# Patient Record
Sex: MALE | Race: BLACK OR AFRICAN AMERICAN | NOT HISPANIC OR LATINO | Employment: UNEMPLOYED | URBAN - METROPOLITAN AREA
[De-identification: names, ages, dates, MRNs, and addresses within clinical notes are randomized per-mention and may not be internally consistent; named-entity substitution may affect disease eponyms.]

---

## 2022-10-15 ENCOUNTER — TELEPHONE (OUTPATIENT)
Dept: BEHAVIORAL HEALTH | Facility: CLINIC | Age: 32
End: 2022-10-15

## 2022-10-15 ENCOUNTER — HOSPITAL ENCOUNTER (EMERGENCY)
Facility: CLINIC | Age: 32
Discharge: HOME | End: 2022-10-19
Attending: EMERGENCY MEDICINE | Admitting: EMERGENCY MEDICINE
Payer: COMMERCIAL

## 2022-10-15 DIAGNOSIS — F22 PARANOID PSYCHOSIS (H): ICD-10-CM

## 2022-10-15 LAB
ALBUMIN SERPL BCG-MCNC: 4.6 G/DL (ref 3.5–5.2)
ALP SERPL-CCNC: 88 U/L (ref 40–129)
ALT SERPL W P-5'-P-CCNC: 6 U/L (ref 10–50)
AMPHETAMINES UR QL SCN: ABNORMAL
ANION GAP SERPL CALCULATED.3IONS-SCNC: 14 MMOL/L (ref 7–15)
AST SERPL W P-5'-P-CCNC: 18 U/L (ref 10–50)
BARBITURATES UR QL SCN: ABNORMAL
BASOPHILS # BLD AUTO: 0.1 10E3/UL (ref 0–0.2)
BASOPHILS NFR BLD AUTO: 1 %
BENZODIAZ UR QL SCN: ABNORMAL
BILIRUB SERPL-MCNC: 0.6 MG/DL
BUN SERPL-MCNC: 6.6 MG/DL (ref 6–20)
BZE UR QL SCN: ABNORMAL
CALCIUM SERPL-MCNC: 9.7 MG/DL (ref 8.6–10)
CANNABINOIDS UR QL SCN: ABNORMAL
CHLORIDE SERPL-SCNC: 103 MMOL/L (ref 98–107)
CREAT SERPL-MCNC: 0.6 MG/DL (ref 0.67–1.17)
DEPRECATED HCO3 PLAS-SCNC: 22 MMOL/L (ref 22–29)
EOSINOPHIL # BLD AUTO: 0.1 10E3/UL (ref 0–0.7)
EOSINOPHIL NFR BLD AUTO: 2 %
ERYTHROCYTE [DISTWIDTH] IN BLOOD BY AUTOMATED COUNT: 13 % (ref 10–15)
ETHANOL SERPL-MCNC: <0.01 G/DL
GFR SERPL CREATININE-BSD FRML MDRD: >90 ML/MIN/1.73M2
GLUCOSE SERPL-MCNC: 96 MG/DL (ref 70–99)
HCT VFR BLD AUTO: 43.1 % (ref 40–53)
HGB BLD-MCNC: 14.5 G/DL (ref 13.3–17.7)
IMM GRANULOCYTES # BLD: 0 10E3/UL
IMM GRANULOCYTES NFR BLD: 0 %
LYMPHOCYTES # BLD AUTO: 2.9 10E3/UL (ref 0.8–5.3)
LYMPHOCYTES NFR BLD AUTO: 43 %
MCH RBC QN AUTO: 29.2 PG (ref 26.5–33)
MCHC RBC AUTO-ENTMCNC: 33.6 G/DL (ref 31.5–36.5)
MCV RBC AUTO: 87 FL (ref 78–100)
MONOCYTES # BLD AUTO: 0.6 10E3/UL (ref 0–1.3)
MONOCYTES NFR BLD AUTO: 9 %
NEUTROPHILS # BLD AUTO: 3 10E3/UL (ref 1.6–8.3)
NEUTROPHILS NFR BLD AUTO: 45 %
NRBC # BLD AUTO: 0 10E3/UL
NRBC BLD AUTO-RTO: 0 /100
OPIATES UR QL SCN: ABNORMAL
PLATELET # BLD AUTO: 328 10E3/UL (ref 150–450)
POTASSIUM SERPL-SCNC: 3.8 MMOL/L (ref 3.4–5.3)
PROT SERPL-MCNC: 7.8 G/DL (ref 6.4–8.3)
RBC # BLD AUTO: 4.97 10E6/UL (ref 4.4–5.9)
SARS-COV-2 RNA RESP QL NAA+PROBE: NEGATIVE
SODIUM SERPL-SCNC: 139 MMOL/L (ref 136–145)
TSH SERPL DL<=0.005 MIU/L-ACNC: 2.36 UIU/ML (ref 0.3–4.2)
WBC # BLD AUTO: 6.7 10E3/UL (ref 4–11)

## 2022-10-15 PROCEDURE — 36415 COLL VENOUS BLD VENIPUNCTURE: CPT | Performed by: EMERGENCY MEDICINE

## 2022-10-15 PROCEDURE — C9803 HOPD COVID-19 SPEC COLLECT: HCPCS | Performed by: EMERGENCY MEDICINE

## 2022-10-15 PROCEDURE — 250N000013 HC RX MED GY IP 250 OP 250 PS 637: Performed by: EMERGENCY MEDICINE

## 2022-10-15 PROCEDURE — U0003 INFECTIOUS AGENT DETECTION BY NUCLEIC ACID (DNA OR RNA); SEVERE ACUTE RESPIRATORY SYNDROME CORONAVIRUS 2 (SARS-COV-2) (CORONAVIRUS DISEASE [COVID-19]), AMPLIFIED PROBE TECHNIQUE, MAKING USE OF HIGH THROUGHPUT TECHNOLOGIES AS DESCRIBED BY CMS-2020-01-R: HCPCS | Performed by: EMERGENCY MEDICINE

## 2022-10-15 PROCEDURE — 99285 EMERGENCY DEPT VISIT HI MDM: CPT | Performed by: EMERGENCY MEDICINE

## 2022-10-15 PROCEDURE — 84443 ASSAY THYROID STIM HORMONE: CPT | Performed by: EMERGENCY MEDICINE

## 2022-10-15 PROCEDURE — 80307 DRUG TEST PRSMV CHEM ANLYZR: CPT | Performed by: EMERGENCY MEDICINE

## 2022-10-15 PROCEDURE — 80359 METHYLENEDIOXYAMPHETAMINES: CPT | Performed by: EMERGENCY MEDICINE

## 2022-10-15 PROCEDURE — 90791 PSYCH DIAGNOSTIC EVALUATION: CPT

## 2022-10-15 PROCEDURE — 85025 COMPLETE CBC W/AUTO DIFF WBC: CPT | Performed by: EMERGENCY MEDICINE

## 2022-10-15 PROCEDURE — 99285 EMERGENCY DEPT VISIT HI MDM: CPT | Mod: 25 | Performed by: EMERGENCY MEDICINE

## 2022-10-15 PROCEDURE — 80053 COMPREHEN METABOLIC PANEL: CPT | Performed by: EMERGENCY MEDICINE

## 2022-10-15 PROCEDURE — 82077 ASSAY SPEC XCP UR&BREATH IA: CPT | Performed by: EMERGENCY MEDICINE

## 2022-10-15 RX ORDER — OLANZAPINE 5 MG/1
10 TABLET, ORALLY DISINTEGRATING ORAL 2 TIMES DAILY PRN
Status: DISCONTINUED | OUTPATIENT
Start: 2022-10-15 | End: 2022-10-19 | Stop reason: HOSPADM

## 2022-10-15 RX ORDER — QUETIAPINE FUMARATE 50 MG/1
50 TABLET, FILM COATED ORAL 2 TIMES DAILY
Status: DISCONTINUED | OUTPATIENT
Start: 2022-10-15 | End: 2022-10-19 | Stop reason: HOSPADM

## 2022-10-15 RX ADMIN — OLANZAPINE 10 MG: 5 TABLET, ORALLY DISINTEGRATING ORAL at 19:05

## 2022-10-15 ASSESSMENT — ACTIVITIES OF DAILY LIVING (ADL)
ADLS_ACUITY_SCORE: 33
ADLS_ACUITY_SCORE: 35

## 2022-10-15 NOTE — ED TRIAGE NOTES
Pt arrives ambulatory to triage seeking a mental health assessment. Pt endorses visual/auditory hallucinations. Pt endorses suicidal thoughts, but difficult to fully assess.

## 2022-10-15 NOTE — ED PROVIDER NOTES
[place] EMERGENCY DEPARTMENT ([facility])  10/15/22 ED 9    History     Chief Complaint   Patient presents with     Hallucinations     The history is provided by the patient and medical records.     Rhonda Bueno is a 32 year old [place] male who presents to the Emergency Department  department with ongoing hallucinations.  It is unclear what his past history is as the patient has no documentation in our Epic system and nothing can be found in Care Everywhere.  It is also unknown exactly what medications the patient is on currently.  The patient presents stating that he is being used as a machine by others.  Patient states he is only one person fighting against all these other individuals and he points to one of them standing in the room which is clearly a hallucination.  The patient states that his front lower teeth hurt but other than that he has no complaints of other pain, chest pain, shortness of breath, abdominal pain, vomiting or diarrhea.  The patient admits to paranoid hallucinations, delusions, and suicidal ideation.    This part of the document was transcribed by Dee Dee Lowry, Medical Scribe.      I have reviewed the Medications, Allergies, Past Medical and Surgical History, and Social History in the Epic system.    No past medical history on file.   Search of care everywhere was unsuccessful    No past surgical history on file.        Review of your medicines      You have not been prescribed any medications.         Past medical history, past surgical history, medications, and allergies were reviewed with the patient. Additional pertinent items: Unknown    No family history on file.    Social History     Tobacco Use     Smoking status: Not on file     Smokeless tobacco: Not on file   Substance Use Topics     Alcohol use: Not on file     Social history was reviewed with the patient. Additional pertinent items: Unknown    No Known Allergies    Review of Systems  A complete review of  systems was attempted but limited due to Psychiatric condition.    Physical Exam   BP: (!) 133/93  Pulse: 99  Temp: 98.6  F (37  C)  Resp: 17  SpO2: 100 %      Physical Exam  Vitals and nursing note reviewed.   Constitutional:       Appearance: He is not diaphoretic.      Comments: Tangential paranoid speech   HENT:      Head: Atraumatic.   Eyes:      Extraocular Movements: Extraocular movements intact.      Pupils: Pupils are equal, round, and reactive to light.   Cardiovascular:      Rate and Rhythm: Regular rhythm.      Heart sounds: Normal heart sounds.   Pulmonary:      Breath sounds: Normal breath sounds.   Abdominal:      Palpations: Abdomen is soft.      Tenderness: There is no abdominal tenderness.   Musculoskeletal:         General: No deformity.      Cervical back: Neck supple.   Neurological:      General: No focal deficit present.      Mental Status: He is alert.   Psychiatric:      Comments: Paranoid, delusional, hallucinatory         ED Course        Procedures         Results for orders placed or performed during the hospital encounter of 10/15/22 (from the past 24 hour(s))   Urine Drugs of Abuse Screen    Narrative    The following orders were created for panel order Urine Drugs of Abuse Screen.  Procedure                               Abnormality         Status                     ---------                               -----------         ------                     Drug abuse screen 1 urin...[[id number]]                                                   Please view results for these tests on the individual orders.   CBC with platelets differential    Narrative    The following orders were created for panel order CBC with platelets differential.  Procedure                               Abnormality         Status                     ---------                               -----------         ------                     CBC with platelets and d...[[id number]]                      Final result                  Please view results for these tests on the individual orders.   Comprehensive metabolic panel   Result Value Ref Range    Sodium 139 136 - 145 mmol/L    Potassium 3.8 3.4 - 5.3 mmol/L    Chloride 103 98 - 107 mmol/L    Carbon Dioxide (CO2) 22 22 - 29 mmol/L    Anion Gap 14 7 - 15 mmol/L    Urea Nitrogen 6.6 6.0 - 20.0 mg/dL    Creatinine 0.60 (L) 0.67 - 1.17 mg/dL    Calcium 9.7 8.6 - 10.0 mg/dL    Glucose 96 70 - 99 mg/dL    Alkaline Phosphatase 88 40 - 129 U/L    AST 18 10 - 50 U/L    ALT 6 (L) 10 - 50 U/L    Protein Total 7.8 6.4 - 8.3 g/dL    Albumin 4.6 3.5 - 5.2 g/dL    Bilirubin Total 0.6 <=1.2 mg/dL    GFR Estimate >90 >60 mL/min/1.73m2   TSH   Result Value Ref Range    TSH 2.36 0.30 - 4.20 uIU/mL   Alcohol ethyl   Result Value Ref Range    Alcohol ethyl <0.01 (H) <=0.00 g/dL   CBC with platelets and differential   Result Value Ref Range    WBC Count 6.7 4.0 - 11.0 10e3/uL    RBC Count 4.97 4.40 - 5.90 10e6/uL    Hemoglobin 14.5 13.3 - 17.7 g/dL    Hematocrit 43.1 40.0 - 53.0 %    MCV 87 78 - 100 fL    MCH 29.2 26.5 - 33.0 pg    MCHC 33.6 31.5 - 36.5 g/dL    RDW 13.0 10.0 - 15.0 %    Platelet Count 328 150 - 450 10e3/uL    % Neutrophils 45 %    % Lymphocytes 43 %    % Monocytes 9 %    % Eosinophils 2 %    % Basophils 1 %    % Immature Granulocytes 0 %    NRBCs per 100 WBC 0 <1 /100    Absolute Neutrophils 3.0 1.6 - 8.3 10e3/uL    Absolute Lymphocytes 2.9 0.8 - 5.3 10e3/uL    Absolute Monocytes 0.6 0.0 - 1.3 10e3/uL    Absolute Eosinophils 0.1 0.0 - 0.7 10e3/uL    Absolute Basophils 0.1 0.0 - 0.2 10e3/uL    Absolute Immature Granulocytes 0.0 <=0.4 10e3/uL    Absolute NRBCs 0.0 10e3/uL     Orders Placed This Encounter   Procedures     Comprehensive metabolic panel     TSH     Alcohol ethyl     Drug Screen 9, Ser/Jojo w/ Rflx to Conf     Drug abuse screen 1 urine (ED)     CBC with platelets and differential     Asymptomatic COVID-19 Virus (Coronavirus) by PCR     Suicide Cart     Urine Drugs of Abuse Screen      CBC with platelets differential     Behavioral medicine saw the patient and recommended hospitalization.    Assessments & Plan (with Medical Decision Making)     I have reviewed the nursing notes and discussed the case with behavioral medicine.    Medications   QUEtiapine (SEROquel) tablet 50 mg (has no administration in time range)   OLANZapine zydis (zyPREXA) ODT tab 10 mg (10 mg Oral Given 10/15/22 1905)         Final diagnoses:   Paranoid psychosis (H)     Admit .  Holdable      DARYA EUGENE MD    10/15/2022   [facility] EMERGENCY DEPARTMENT     Darya Eugene MD  10/15/22 1927

## 2022-10-15 NOTE — ED NOTES
ED Triage Provider Note  [facility]  Encounter Date: Oct 15, 2022    History:  Chief Complaint   Patient presents with     Hallucinations     Rhonda Bueno is a 32 year old male who presents to the ED with mental health concerns.  Patient describes paranoia and hallucinations.  He endorses suicidal ideation but is difficult to fully assess this as he is very tangential and disorganized.  He denies any drug or alcohol use.    Review of Systems:  Hallucinations    Exam:  BP (!) 133/93   Pulse 99   Temp 98.6  F (37  C) (Oral)   Resp 17   SpO2 100%   General: No acute distress. Appears stated age.   Cardio: Regular rate, extremities well perfused  Resp: Normal work of breathing, grossly normal respiratory rate  Neuro: Alert. CN II-XII grossly intact. Grossly intact strength.   Psych: Tangential, paranoid with hallucinations.    Medical Decision Making:  Patient arriving to the ED with problem as above. A medical screening exam was performed. DEC orders initiated from Triage. The patient is appropriate to be immediately roomed.       Saad Torre,  on 10/15/2022 at 4:41 PM     Saad Torre,   10/15/22 5931

## 2022-10-15 NOTE — TELEPHONE ENCOUNTER
S DEC called to give clinical on 32/M in [facility]    B Pt presents paranoid, disorganized, delusional, tangential speech, difficulty answering questions for assessment. Pt made SI comment during triage but had difficulty answering questions. Appears that pt responding to internal stimuli, AH and VH. Unknown if pt has had prev [facility] stays,  OP providers or any  medications RX'd. MH DX unspecified psychosis. No HI or aggression. Pt denies substance use. No major medical concerns. Ambulatory, eating, drinking.     A Vol    R In [facility] awaiting placement.   Patient cleared and ready for behavioral bed placement: Yes

## 2022-10-15 NOTE — CONSULTS
"Diagnostic Evaluation Consultation  Crisis Assessment    Patient was assessed: Forrest  Patient location: [facility] [place]  Was a release of information signed: No. Reason: patient was seen virtually      Referral Data and Chief Complaint  Patient is a  year old, who uses he/him pronouns, and presents to the ED alone. Patient is referred to the ED by self. Patient is presenting to the ED for the following concerns: Patient presented to the ED disorganized, responding to internal stimuli and delusional.  Patient was tangential.   .      Informed Consent and Assessment Methods     Patient is his own guardian. Writer met with patient and explained the crisis assessment process, including applicable information disclosures and limits to confidentiality, assessed understanding of the process, and obtained consent to proceed with the assessment. Patient was observed to be able to participate in the assessment as evidenced by patient could not answer interview questions.. Assessment methods included conducting a formal interview with patient, review of medical records, collaboration with medical staff, and obtaining relevant collateral information from family and community providers when available..     Over the course of this crisis assessment offered validation. Patient's response to interventions was unrelated to interview question.  Patient was responding to internal stimuli.  When asked  \"where do you live\" Patient answered \"here with others.myalgias dad  and I can see his face now...\"  When asked if he is on any medication, he lifted a cup and stated \"now you see I can take what I want...\"     Summary of Patient Situation     Patient was very disorganized and did not have the ability to respond to interview questions.  He made statement pertaining to suicide but he couldn't answer subsequent questions. Patient was tangential.        Brief Psychosocial History    Patient was not able to participate.  Writer " looked at other resources for information but could not see any information relating to patient.    Significant Clinical History      {Patient presented with paranoia.  Patient was responding to internal stimuli. No history of treatment was found.       Collateral Information  None reported by patient     Risk Assessment  ESS-6  1.a. Over the past 2 weeks, have you had thoughts of killing yourself? No  1.b. Have you ever attempted to kill yourself and, if yes, when did this last happen? No   2. Recent or current suicide plan? No   3. Recent or current intent to act on ideation? No  4. Lifetime psychiatric hospitalization? No  5. Pattern of excessive substance use? No  6. Current irritability, agitation, or aggression? No  Scoring note: BOTH 1a and 1b must be yes for it to score 1 point, if both are not yes it is zero. All others are 1 point per number. If all questions 1a/1b - 6 are no, risk is negligible. If one of 1a/1b is yes, then risk is mild. If either question 2 or 3, but not both, is yes, then risk is automatically moderate regardless of total score. If both 2 and 3 are yes, risk is automatically high regardless of total score.      Score: 0, negligible risk      Does the patient have access to lethal means? No     Does the patient engage in non-suicidal self-injurious behavior (NSSI/SIB)? no     Does the patient have thoughts of harming others? No     Is the patient engaging in sexually inappropriate behavior?  no        Current Substance Abuse     Is there recent substance abuse? no     Was a urine drug screen or blood alcohol level obtained: Yes result is pending       Mental Status Exam     Affect: Dramatic   Appearance: Disheveled    Attention Span/Concentration: Inattentive  Eye Contact: Variable   Fund of Knowledge: Delayed    Language /Speech Content: Non-fluent   Language /Speech Volume: Normal    Language /Speech Rate/Productions: Pressured    Recent Memory: Poor   Remote Memory: Poor   Mood:  Anxious    Orientation to Person: No    Orientation to Place: No   Orientation to Time of Day: No    Orientation to Date: No    Situation (Do they understand why they are here?): Answer: to get help    Psychomotor Behavior: Normal    Thought Content: Paranoia   Thought Form: Tangential      History of commitment: No           Medication    Psychotropic medications: No  Medication changes made in the last two weeks: No       Current Care Team    Primary Care Provider: No  Psychiatrist: No  Therapist: No  : No     CTSS or ARMHS: No  ACT Team: No  Other: No      Diagnosis    298.9 (F29)  Unspecified Schizophrenia Spectrum   311 (F32.9) Unspecified Depressive Disorder  - primary   Adjustment Disorders  309.28 (F43.23) With mixed anxiety and depressed mood - by history     Clinical Summary and Substantiation of Recommendations      Patient presented to the ED with delusion and disorganized thoughts.  Patient mentioned that he was born in [place] and that he witnessed his dad  and that he is alone and has no support.  Patient presented to the ED alone and told the triage staff that he needed help.  Patient was responding to internal stimuli and couldn't respond to interview questions.  Writer consulted with ED physician and the reommendation is admit inpatient for further mental health evaluation.      Admission to Inpatient Level of Care is indicated due to:    1. Patient risk of severity of behavioral health disorder is appropriate to proposed level of care as indicated by:    Imminent Risk of Harm: Command auditory hallucinations or paranoid delusions contributing to risk of suicide or self harm  And/or:  Behavioral health disorder is present and appropriate for inpatient care with both of the following:     Severe psychiatric, behavioral or other comorbid conditions are appropriate for management at inpatient mental health as indicated by at least one of the following:   o Hallucinations; delusions;  positive symptoms    Severe dysfunction in daily living is present as indicated by at least one of the following:   o Complete inability to maintain any appropriate aspect of personal responsibility in any adult roles    2. Inpatient mental health services are necessary to meet patient needs and at least one of the following:  Specific condition related to admission diagnosis is present and judged likely to further improve at proposed level of care    3. Situation and expectations are appropriate for inpatient care, as indicated by one of the following:   Voluntary treatment at lower level of care is not feasible    Disposition    Recommended disposition: Inpatient Mental Health       Reviewed case and recommendations with attending provider. Attending Name: Dr. Huber       Attending concurs with disposition: Yes       Patient concurs with disposition: Yes       Guardian concurs with disposition: NA      Final disposition: Inpatient mental health .     Inpatient Details (if applicable):   Is patient admitted voluntarily:Yes      Patient aware of potential for transfer if there is not appropriate placement? Yes       Patient is willing to travel outside of the [facility] for placement? Yes      Behavioral Intake Notified? Yes: Date: 10/15/22 Time: 6:50 pm.     Outpatient Details (if applicable):   Aftercare plan and appointments placed in the AVS and provided to patient: No. Rationale: patient is admitting     Was lethal means counseling provided as a part of aftercare planning? No;       Assessment Details    Patient interview started at: 6:15 pm and completed at: 6:45 pm.     Total duration spent on the patient case in minutes: .50 hrs      CPT code(s) utilized: 04151 - Psychotherapy for Crisis - 60 (30-74*) min       RAQUEL Olson, MSW, LICSW, LMHP  DEC - Triage & Transition Services  Callback: [phone]

## 2022-10-16 ENCOUNTER — TELEPHONE (OUTPATIENT)
Dept: BEHAVIORAL HEALTH | Facility: CLINIC | Age: 32
End: 2022-10-16

## 2022-10-16 PROCEDURE — 250N000013 HC RX MED GY IP 250 OP 250 PS 637: Performed by: EMERGENCY MEDICINE

## 2022-10-16 RX ORDER — OLANZAPINE 5 MG/1
5 TABLET, ORALLY DISINTEGRATING ORAL ONCE
Status: COMPLETED | OUTPATIENT
Start: 2022-10-16 | End: 2022-10-16

## 2022-10-16 RX ADMIN — OLANZAPINE 5 MG: 5 TABLET, ORALLY DISINTEGRATING ORAL at 16:35

## 2022-10-16 RX ADMIN — QUETIAPINE FUMARATE 50 MG: 50 TABLET ORAL at 11:55

## 2022-10-16 RX ADMIN — QUETIAPINE FUMARATE 50 MG: 50 TABLET ORAL at 20:03

## 2022-10-16 ASSESSMENT — ACTIVITIES OF DAILY LIVING (ADL)
ADLS_ACUITY_SCORE: 35

## 2022-10-16 NOTE — PROGRESS NOTES
Writer contacted [facility] intake and placed patient on bed queue.  Central In take can be reached at [phone] for bed update.

## 2022-10-16 NOTE — CONSULTS
Samquyen Meena Bueno  October 15, 2022  Plan of Care Hand-off Note     Patient Care Path: Inpatient Mental Health    Plan for Care:       Patient presented to the ED with delusion and disorganized thoughts.  Patient mentioned that he was born in [place] and that he witnessed his dad  and that he is alone and has no support.  Patient presented to the ED alone and told the triage staff that he needed help.  Patient was responding to internal stimuli and couldn't respond to interview questions.  Writer consulted with ED physician and the reommendation is admit inpatient for further mental health evaluation. Patient has no community providers and he is not on any known medications.          Critical Safety Issues: Patient is paranoid, responding to both auditory and visual hallucinations     Overview:  This patient is a child/adolescent: No    This patient has additional special visitor precautions: No    Legal Status: Voluntary    Contacts:   none reported, none identified: Contact .    Psychiatry Consult:  Adult Psychiatry Consult requested related to mental health evaluation. Psychiatry IP Consult Order Placed: No .    Updated RN regarding plan of care.    RAQUEL Olson

## 2022-10-16 NOTE — ED PROVIDER NOTES
32-year-old male signed over to me by my colleague at 3 PM pending reassessment by behavioral 's.  I did receive a call shortly after signout with concern the patient continues to have significant disorganization.  As he presented with SI concern at this time is patient not safe for dismissal to home.  Unclear if primarily substance-induced psychosis versus more organic.  Plan will be for p.o. dose of Zyprexa with continued monitoring in the emergency department.  At this time the patient is voluntary but likely holdable.  Recommendation per DEC team is for continued emergency department observation to assess for clearing.  Signed over to my colleague at 2300 pending above plan.     Nixon Stevenson MD  10/16/22 7840

## 2022-10-16 NOTE — ED NOTES
[facility] ED Mental Health Handoff Note:       Brief HPI:  This is a 32 year old male signed out to me by Dr. Lane.  See initial ED Provider note for full details of the presentation. Interval history is pertinent for nothing.    Home meds reviewed and ordered/administered: Yes. No home meds listed.    Medically stable for inpatient mental health admission: Yes.    Evaluated by mental health: Yes. The recommendation is for inpatient mental health treatment. Bed search in process    Safety concerns: At the time I received sign out, there were no safety concerns.    Hold Status:  Active Orders   N/A           Exam:   Patient Vitals for the past 24 hrs:   BP Temp Temp src Pulse Resp SpO2   10/16/22 0026 115/68 98.4  F (36.9  C) Oral 65 18 99 %   10/15/22 1639 (!) 133/93 98.6  F (37  C) Oral 99 17 100 %           ED Course:    Medications   QUEtiapine (SEROquel) tablet 50 mg (50 mg Oral Not Given 10/15/22 2016)   OLANZapine zydis (zyPREXA) ODT tab 10 mg (10 mg Oral Given 10/15/22 1905)            There were no significant events during my shift.    Patient was signed out to the oncoming provider      Impression:    ICD-10-CM    1. Paranoid psychosis (H)  F22           Plan:    1. Awaiting inpatient mental health admission/transfer.   2. Notified by nurse that pt starting to be more interactive, requesting detox from alcohol and drugs, will request re-eval by DEC      RESULTS:   Results for orders placed or performed during the hospital encounter of 10/15/22 (from the past 24 hour(s))   CBC with platelets differential     Status: None    Collection Time: 10/15/22  6:21 PM    Narrative    The following orders were created for panel order CBC with platelets differential.  Procedure                               Abnormality         Status                     ---------                               -----------         ------                     CBC with platelets and d...[[id number]]                      Final  result                 Please view results for these tests on the individual orders.   Comprehensive metabolic panel     Status: Abnormal    Collection Time: 10/15/22  6:21 PM   Result Value Ref Range    Sodium 139 136 - 145 mmol/L    Potassium 3.8 3.4 - 5.3 mmol/L    Chloride 103 98 - 107 mmol/L    Carbon Dioxide (CO2) 22 22 - 29 mmol/L    Anion Gap 14 7 - 15 mmol/L    Urea Nitrogen 6.6 6.0 - 20.0 mg/dL    Creatinine 0.60 (L) 0.67 - 1.17 mg/dL    Calcium 9.7 8.6 - 10.0 mg/dL    Glucose 96 70 - 99 mg/dL    Alkaline Phosphatase 88 40 - 129 U/L    AST 18 10 - 50 U/L    ALT 6 (L) 10 - 50 U/L    Protein Total 7.8 6.4 - 8.3 g/dL    Albumin 4.6 3.5 - 5.2 g/dL    Bilirubin Total 0.6 <=1.2 mg/dL    GFR Estimate >90 >60 mL/min/1.73m2   TSH     Status: Normal    Collection Time: 10/15/22  6:21 PM   Result Value Ref Range    TSH 2.36 0.30 - 4.20 uIU/mL   Alcohol ethyl     Status: Abnormal    Collection Time: 10/15/22  6:21 PM   Result Value Ref Range    Alcohol ethyl <0.01 (H) <=0.00 g/dL   CBC with platelets and differential     Status: None    Collection Time: 10/15/22  6:21 PM   Result Value Ref Range    WBC Count 6.7 4.0 - 11.0 10e3/uL    RBC Count 4.97 4.40 - 5.90 10e6/uL    Hemoglobin 14.5 13.3 - 17.7 g/dL    Hematocrit 43.1 40.0 - 53.0 %    MCV 87 78 - 100 fL    MCH 29.2 26.5 - 33.0 pg    MCHC 33.6 31.5 - 36.5 g/dL    RDW 13.0 10.0 - 15.0 %    Platelet Count 328 150 - 450 10e3/uL    % Neutrophils 45 %    % Lymphocytes 43 %    % Monocytes 9 %    % Eosinophils 2 %    % Basophils 1 %    % Immature Granulocytes 0 %    NRBCs per 100 WBC 0 <1 /100    Absolute Neutrophils 3.0 1.6 - 8.3 10e3/uL    Absolute Lymphocytes 2.9 0.8 - 5.3 10e3/uL    Absolute Monocytes 0.6 0.0 - 1.3 10e3/uL    Absolute Eosinophils 0.1 0.0 - 0.7 10e3/uL    Absolute Basophils 0.1 0.0 - 0.2 10e3/uL    Absolute Immature Granulocytes 0.0 <=0.4 10e3/uL    Absolute NRBCs 0.0 10e3/uL   Asymptomatic COVID-19 Virus (Coronavirus) by PCR Nasopharyngeal     Status:  Normal    Collection Time: 10/15/22  7:05 PM    Specimen: Nasopharyngeal; Swab   Result Value Ref Range    SARS CoV2 PCR Negative Negative    Narrative    Testing was performed using the Xpert Xpress SARS-CoV-2 Assay on the  Cepheid Gene-Xpert Instrument Systems. Additional information about  this Emergency Use Authorization (EUA) assay can be found via the Lab  Guide. This test should be ordered for the detection of SARS-CoV-2 in  individuals who meet SARS-CoV-2 clinical and/or epidemiological  criteria. Test performance is unknown in asymptomatic patients. This  test is for in vitro diagnostic use under the FDA EUA for  laboratories certified under CLIA to perform high complexity testing.  This test has not been FDA cleared or approved. A negative result  does not rule out the presence of PCR inhibitors in the specimen or  target RNA in concentration below the limit of detection for the  assay. The possibility of a false negative should be considered if  the patient's recent exposure or clinical presentation suggests  COVID-19. This test was validated by the [facility]. This laboratory is certified under  the Clinical Laboratory Improvement Amendments of 1988 (CLIA-88) as  qualified to perform high complexity laboratory testing.     Urine Drugs of Abuse Screen     Status: Abnormal    Collection Time: 10/15/22  8:18 PM    Narrative    The following orders were created for panel order Urine Drugs of Abuse Screen.  Procedure                               Abnormality         Status                     ---------                               -----------         ------                     Drug abuse screen 1 urin...[[id number]]  Abnormal            Final result                 Please view results for these tests on the individual orders.   Drug abuse screen 1 urine (ED)     Status: Abnormal    Collection Time: 10/15/22  8:18 PM   Result Value Ref Range    Amphetamines  Urine Screen Positive (A) Screen Negative    Barbituates Urine Screen Negative Screen Negative    Benzodiazepine Urine Screen Negative Screen Negative    Cannabinoids Urine Screen Negative Screen Negative    Cocaine Urine Screen Negative Screen Negative    Opiates Urine Screen Negative Screen Negative             CHRIS BECKER MD PhD                       Chris Becker MD  10/16/22 1126       Chris Becker MD  10/16/22 1348
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
## 2022-10-16 NOTE — TELEPHONE ENCOUNTER
0539 Bed Search Update:    [facility] beds available  [facility] beds available  [facility] beds [facility]  [facility]  [facility] beds [facility]  [facility] beds [facility]  [facility]-No beds available    Remains on wait list.

## 2022-10-16 NOTE — TELEPHONE ENCOUNTER
Inpatient Bed Call Log 10/16/2022 7:04 AM      Adults:   [facility] only     [facility] is posting 0 beds.       [facility] is posting 0 beds.     [facility] is posting 0 beds.     [facility] is posting 0 beds.     [facility] is [facility] 0 beds.     [facility] is posting 0 beds.     [facility] is posting 0 beds.     [facility] is posting 0 beds.        [facility] is posting 0 beds. Mixed unit 12+. Low acuity only.      [facility] is positing 0 beds. No aggression.      [facility] is posting 0 beds.      [facility] is posting 0 beds. Low acuity only.     [facility] is posting 0 beds.     [facility] is posting 0 beds. Low acuity.      [facility] is posting 0 beds. 72 hr hold required.      [facility] is posting 1 beds.         [facility] is posting 0 beds. Vol only, No Hx of aggression, violence or assault. No sexual offenders. No 72 hr holds.     [facility] is posting 8 beds. (Must have the cognitive ability to do programming. No aggressive or violent behavior or recent HX in the last 2 yrs. MH must be primary.)     [facility] is posting 0 beds. Low acuity only. Violence and aggression capped.      [facility] access hospital is posting 0 beds. Low acuity, Neg Covid.      [facility] is posting 0 beds. Covid neg. Vol only. Combined adolescent and adult unit. No aggressive or violent behavior. No registered sex offenders.      [facility] is posting 2 beds. Call for details.     [facility] is posting 0 beds.     7:40am No appropriate beds available at this time.

## 2022-10-17 ENCOUNTER — TELEPHONE (OUTPATIENT)
Dept: BEHAVIORAL HEALTH | Facility: CLINIC | Age: 32
End: 2022-10-17

## 2022-10-17 PROCEDURE — 250N000013 HC RX MED GY IP 250 OP 250 PS 637: Performed by: EMERGENCY MEDICINE

## 2022-10-17 RX ADMIN — QUETIAPINE FUMARATE 50 MG: 50 TABLET ORAL at 20:03

## 2022-10-17 RX ADMIN — QUETIAPINE FUMARATE 50 MG: 50 TABLET ORAL at 08:22

## 2022-10-17 ASSESSMENT — ACTIVITIES OF DAILY LIVING (ADL)
ADLS_ACUITY_SCORE: 35

## 2022-10-17 NOTE — PLAN OF CARE
Shift: 2754-5064    Pt was cooperative and calm, but has disorganized thoughts. Pt slept throughout the night and denies any pain.     VS: BP 96/44 (BP Location: Right arm)   Pulse 67   Temp (!) 96.3  F (35.7  C) (Oral)   Resp 16   SpO2 99%

## 2022-10-17 NOTE — ED NOTES
"Triage & Transition Services, Extended Care     Therapy Progress Note    Patient: Rhonda goes by \"Rhonda,\" uses he/him pronouns  Date of Service: October 17, 2022  Site of Service: [facility] ED  Patient was seen virtually (AmWell cart or other teleconferencing device).     Presenting problem:   Rhonda is followed related to Long wait time for admission: 47+ hours at the time of this note. Please see initial DEC/[facility] Crisis Assessment completed by Nhung Chan on 10/16/22 for complete assessment information. Notable concerns include hallucinations,     Individuals Present: Samquyen & Travis Giang    Session start: 2:37 PM  Session end: 2:57 PM  Session duration in minutes: 20  Session number: 2  Anticipated number of sessions or this episode of care: 1-4  CPT utilized: 68057 - Psychotherapy (with patient) - 30 (16-37*) min    Current Presentation:   Patient was somewhat engaged while meeting with writer. Patient did request an interpretur although he was responding to writer in english at times. Additionally even with the  patient was having difficulty tracking the conversation. Patient continues to appear somewhat grandiose, and exhibits signs of paranoia. Patient states he sees \"all things from God's view\" and endorses feeling persecuted by everyone as if \"I am the most terrible being on the planet\". Patient continues to have difficulty with discussing events leading him to the hospital, and does appear somewhat confused at times. When asked about suicidal thoughts patient does not confirm or deny them; however he does report that he feels \"I will be dead soon\".     Mental Status Exam:   Appearance: awake, alert and dressed in hospital scrubs  Attitude: somewhat cooperative  Eye Contact: looking around room  Mood: anxious and depressed  Affect: intensity is heightened  Speech: somewhat slurred  Psychomotor Behavior: no evidence of tardive dyskinesia, dystonia, or tics  Thought Process:  " disorganized and evidence of thought blocking present  Associations: no loose associations  Thought Content: passive suicidal ideation present and patient appears to be responding to internal stimuli  Insight: limited  Judgement: poor  Oriented to: person and place  Attention Span and Concentration: limited  Recent and Remote Memory: poor    Diagnosis:   298.9 (F29)  Unspecified Schizophrenia Spectrum   311 (F32.9) Unspecified Depressive Disorder  - primary   Adjustment Disorders  309.28 (F43.23) With mixed anxiety and depressed mood - by history     Therapeutic Intervention(s):   Provided active listening, unconditional positive regard, and validation. Engaged in guided discovery, explored patient's perspectives and helped expand them through socratic dialogue.    Treatment Objective(s) Addressed:   The focus of this session was on rapport building, orienting the patient to therapy and assessing safety.     Progress Towards Goals:   Patients symptoms impact his ability to engage in goal setting.    Case Management:   Writer did speak to patients , Hilario [phone], through [facility]. Hilario reports that patient has had several admissions to [facility], including a few this year. He has had 3 suicide attempts this year, including jumping off a 3rd floor window, and overdosing two times. Additionally Hilario reports that patient generally needs questions to be repeated to him several times.Hilario also reports patient has a long history of substance use, and he has been known to use alcohol, crack, meth, heroine, and fentynl. Hilario has been working with patient since he move to [place] from [place].    General Recommendations:   Continue to monitor for harm. Consider: Use a positive, direct and calm approach. Pt's tend to match the energy/mood of the staff. Keep focus positive and upbeat, Listen in a neutral, non-judgmental way. Offer reassurance and Be mindful of your nonverbal cues (body language,  facial expressions)    Plan:   Writer continues to recommend inpatient mental health placement at this time as patient continues to be disorganized, confused, and likely experiencing symptoms of psychosis. Patient also made references to being dead soon, so writer feels he would continue to benefit from an inpatient mental health hospitalization.  Writer does feel patient is holdable if he were to try and leave.    Plan for Care reviewed with Assigned Medical Provider? Yes. Provider, ED MD, response: in agreement with IP MH placement     Travis Giang   Licensed Mental Health Professional (LMHP), [facility]  [phone]

## 2022-10-17 NOTE — TELEPHONE ENCOUNTER
R: Pt in [place] [facility] awaiting placement. Per chart, metro area for placement    7pm bed search    [facility]: No beds available  [facility]  [facility]: No beds available  [facility]: No beds available  [facility]: No [facility]  [facility]: No beds available  [facility]: No beds available  [facility]: No beds available  [facility]: No beds available    Pt placed on work list until appropriate placement is available

## 2022-10-17 NOTE — PROGRESS NOTES
Mohamed more coherent and participating slightly more throughout day.  Did eat and drink with much encouragement, wanting to sleep with blankets over head all day.  Notified MD that patient requested ETOH and Drug rehab.  Resting in bed.  No hallucinations noted.  Speaks of God knowing all, but otherwise alert and oriented.

## 2022-10-17 NOTE — TELEPHONE ENCOUNTER
[facility] Access Inpatient Bed Call Log 10/17/2022 3:25 PM        Adults:    [place] is at capacity.    [facility] is posting 0 beds[facility]    [facility] is posting 0 beds.    [facility] is posting 0 beds.    [facility] is posting 0 beds.    [facility] is posting 0 beds.     [facility] is posting 0 beds.    [facility] is posting 0 beds.    [facility] is posting 0 beds.        [facility] is posting 0 beds. Mixed unit 12+. Low acuity only.    [facility] [place] is positing 0 beds. No aggression.    [facility] is posting 0 beds.    [facility] is posting 0 beds. Low acuity only.    [facility] is posting 0 beds.    [facility] is posting 0 beds. Low acuity.    [facility] is posting 0 beds. 72 hr hold required.    [facility] is posting 0 beds.    [facility] is posting 0 beds. Vol only, No Hx of aggression, violence or assault. No sexual offenders. No 72 hr holds.     [facility] is posting 5 beds. (Must have the cognitive ability to do programming. No aggressive or violent behavior or recent HX in the last 2 yrs. MH must be primary.)    [facility] is posting 0 beds. Low acuity only. Violence and aggression capped.     [facility] is posting 0 beds. Low acuity, Neg Covid.    [facility] is posting 0 beds. Covid neg. Vol only. Combined adolescent and adult unit. No aggressive or violent behavior. No registered sex offenders.     [facility] is posting 4 beds. Call for details. [facility] is at capacity.    [facility] is posting 0 beds.     Pt remains on waitlist pending available bed.

## 2022-10-17 NOTE — TELEPHONE ENCOUNTER
R: [facility] ONLY    [facility] Access Inpatient Bed Call Log 10/17/2022 7:37 AM         Adults:              [facility] is posting 0 beds.              [facility] is posting 0 beds.              [facility] is posting 0 beds.              [facility] is posting 0 beds.              [facility] is posting 0 beds.               [facility] is posting 0 beds.              [facility] is posting 0 beds.              [facility] is posting 0 beds.                   [facility] is posting 0 beds. Mixed unit 12+. Low acuity only.              [facility] is positing 0 beds. No aggression.              [facility] is posting 0 beds.              [facility] is posting 0 beds. Low acuity only.              [facility] is posting 0 beds.              [facility] is posting 0 beds. Low acuity.              [facility] is posting 0 beds. 72 hr hold required.              [facility] is posting 0 beds.       [facility] is posting 0 beds. Vol only, No Hx of aggression, violence or assault. No sexual offenders. No 72 hr holds.        [facility] is posting 6 beds. (Must have the cognitive ability to do programming. No aggressive or violent behavior or recent HX in the last 2 yrs. MH must be primary.)       [facility] is posting 0 beds. Low acuity only. Violence and aggression capped.        [facility] is posting 0 beds. Low acuity, Neg Covid.       [facility] is posting 0 beds. Covid neg. Vol only. Combined adolescent and adult unit. No aggressive or violent behavior. No registered sex offenders.        [facility] is posting 0 beds. Call for details.              [facility] is posting 0 beds.     No appropriate beds.

## 2022-10-17 NOTE — TELEPHONE ENCOUNTER
0214 Bed Search Update:    [facility]-No beds available  [facility]-No beds available  [facility] beds available  [facility] beds [facility]  [facility] beds available  [facility] beds available  [facility]-No beds available    Remains on wait list.

## 2022-10-18 LAB
AMPHETAMINES SERPL QL SCN: POSITIVE NG/ML
ANNOTATION COMMENT IMP: NORMAL
BARBITURATES SERPL QL SCN: NEGATIVE NG/ML
BENZODIAZ SERPL QL SCN: NEGATIVE NG/ML
BUPRENORPHINE SERPL-MCNC: NEGATIVE NG/ML
CANNABINOIDS SERPL QL SCN: NEGATIVE NG/ML
COCAINE SERPL QL SCN: NEGATIVE NG/ML
METHADONE SERPL QL SCN: NEGATIVE NG/ML
METHAMPHET SERPL QL: POSITIVE NG/ML
OPIATES SERPL QL SCN: NEGATIVE NG/ML
OXYCODONE SERPL QL: NEGATIVE NG/ML
PCP SERPL QL SCN: NEGATIVE NG/ML

## 2022-10-18 PROCEDURE — 250N000013 HC RX MED GY IP 250 OP 250 PS 637: Performed by: EMERGENCY MEDICINE

## 2022-10-18 RX ORDER — OLANZAPINE 10 MG/2ML
10 INJECTION, POWDER, FOR SOLUTION INTRAMUSCULAR ONCE
Status: DISCONTINUED | OUTPATIENT
Start: 2022-10-18 | End: 2022-10-19 | Stop reason: HOSPADM

## 2022-10-18 RX ORDER — OLANZAPINE 5 MG/1
10 TABLET, ORALLY DISINTEGRATING ORAL ONCE
Status: DISCONTINUED | OUTPATIENT
Start: 2022-10-18 | End: 2022-10-19 | Stop reason: HOSPADM

## 2022-10-18 RX ADMIN — QUETIAPINE FUMARATE 50 MG: 50 TABLET ORAL at 11:40

## 2022-10-18 RX ADMIN — QUETIAPINE FUMARATE 50 MG: 50 TABLET ORAL at 19:39

## 2022-10-18 ASSESSMENT — ACTIVITIES OF DAILY LIVING (ADL)
ADLS_ACUITY_SCORE: 35

## 2022-10-18 NOTE — TELEPHONE ENCOUNTER
0230 Bed Search Update:    [facility]-No beds available  [facility]-No beds available  [facility] beds [facility]  [facility]-No beds [facility]  [facility]  [facility]-No beds available  [facility]-No beds available    Remains on wait list.

## 2022-10-18 NOTE — ED NOTES
Triage & Transition Services, Extended [facility]     Rhonda Bueno  October 18, 2022    Rhonda is followed related to Long wait time for admission: 70+ hours at the time of this note. Please see initial DEC Crisis Assessment completed for complete assessment information. Medical record is reviewed. While patient is in the ED, care team is working towards Learn and Demonstrate at Least One Skill Focused on Crisis Stabilization.    There are not significant status changes.     Patient declined to meet with writer today.    Plan:  Writer continues to recommend inpatient mental health placement at this time as patient continues to be disorganized, confused, and likely experiencing symptoms of psychosis. Patient also made references to being dead soon, so writer feels he would continue to benefit from an inpatient mental health hospitalization.  Writer does feel patient is holdable if he were to try and leave.    Plan for Care reviewed with Assigned Medical Provider? Yes. Provider, KENA Odonnell    [facility] will follow and meet with patient/family/care team as able or requested.     Travis Giang  [facility], Extended Care   [phone]

## 2022-10-19 ENCOUNTER — TELEPHONE (OUTPATIENT)
Dept: BEHAVIORAL HEALTH | Facility: CLINIC | Age: 32
End: 2022-10-19

## 2022-10-19 VITALS
TEMPERATURE: 96.5 F | SYSTOLIC BLOOD PRESSURE: 104 MMHG | DIASTOLIC BLOOD PRESSURE: 59 MMHG | OXYGEN SATURATION: 97 % | HEART RATE: 68 BPM | RESPIRATION RATE: 16 BRPM

## 2022-10-19 PROCEDURE — 99203 OFFICE O/P NEW LOW 30 MIN: CPT | Performed by: PSYCHIATRY & NEUROLOGY

## 2022-10-19 ASSESSMENT — ACTIVITIES OF DAILY LIVING (ADL)
ADLS_ACUITY_SCORE: 35

## 2022-10-19 NOTE — ED NOTES
Triage & Transition Services, Extended Care     Rhonda Bueno  October 19, 2022    Rhonda is followed related to Long wait time for admission: 89+ hours at the time of this note. Please see initial DEC Crisis Assessment completed for complete assessment information. Medical record is reviewed. While patient is in the ED, care team is working towards Demonstrate Absence of Non Suicidal Self Injurious Behaviors for at least 24 hours.     There are not significant status changes.     Writer provided collateral information to RAQUEL Hill, regarding patient and his hold status.    Plan:  Writer received a call from Dr Padilla, who did not feel patient was holdable, and reports that he does have outpatient services in place. Dr Padilla does feel comfortable with patient discharging.    Extended Care will follow and meet with patient/family/care team as able or requested.     Travis Giang  [facility], Extended Care   [phone]

## 2022-10-19 NOTE — DISCHARGE INSTRUCTIONS
Please follow-up with your regular doctors as needed.  You have been evaluated by Dr. Padilla of psychiatry.  He does not feel you need admission.  You will be discharged home    You may call [facility]; [phone]  for an assessment

## 2022-10-19 NOTE — TELEPHONE ENCOUNTER
R: The pt is currently in the [place] [facility] awaiting placement.     Intake Morning Bed Search (Done at 8:00am)  [facility] is posting 0 beds.   [facility] is posting 0 beds.  [facility] is posting 0 beds.  [facility] is posting 0 beds.  [facility] is posting 0 beds.  [facility] is posting 0 beds.  [facility] is posting 0 beds.  [facility] is posting 0 beds. Low acuity.     8:11a [facility] at capacity. The pt remains on the waitlist. Intake continues to identify appropriate bed placement.    11:43a Intake received a call from Dr. Don Psychiatry. Provider is discharging the pt w/ outpatient supports.Intake updated the work-list. Intake no longer seeking active placement.

## 2022-10-19 NOTE — PROGRESS NOTES
"Patient upset this am about wanting breakfast. He expressed he was not receiving meals. Writer made patient peanut butter toast and gave him a courteous meal an hour before this episode. Writer also explained that he can order breakfast at 0630. He stated, \"im not in a motel why do I need to order my breakfast\". Patient escalated and became upset about being at this hospital and stated \"God take my soul away\". He then refused morning vital signs and told writer and other staff member to get out of his room. Patient in bed with his blanket covering his face; phone is currently with patient. 1:1 observation continued.    "

## 2022-10-19 NOTE — ED NOTES
The patient has been in the ED for 91 hours awaiting inpatient psychiatric bed availability.  He was seen by Dr. Padilla of psychiatry on 10/19/2022  in the morning.  Please see his consult note for details.  Dr. Vivar does not believe the patient requires admission.  He will remove the 72-hour hold for admission and request that the patient be discharged home.     He was instructed to follow-up with his outpatient providers and was given the number to the [facility] for an assessment      MD Iona Mercado Alda L, MD  10/19/22 [id number]

## 2022-10-19 NOTE — ED NOTES
"Triage & Transition Services, Extended Care     Rhonda Bueno  October 19, 2022    Rhonda is followed related to 72 Hour Hold: expires 10/21/22 at 6:06 PM. Please see initial DEC Crisis Assessment completed for complete assessment information. Medical record is reviewed. While patient is in the ED, care team is working towards Learn and Demonstrate at Least One Skill Focused on Crisis Stabilization. Additional notes include hallucinations, disorganized thinking, SI.    Per RN: Ignoring RN this AM. Refused morning vital signs and ED staff members to get out of his room. Pt is hiding under the covers and not answering RN. Upset because he has to order his meals. Code 21 last night due to agitation and attempting to elope the hospital.     This writer attempted to meet with pt. Pt declines. Pt wants RN to call the police due to being \"forced to stay in the ED\", pt stated he does not know why he was brought to the ED and wants to leave. Pt continues to be disorganized, confused, and likely experiencing symptoms of psychosis. Patient also made references to being dead soon or for \"God take my soul\".    There are not significant status changes.     Plan:  IP mental health is recommended for pt at this time. Pt is unable to be assess for safety for a lower level of care. Pt presents with confusion and lacks insight into his mental health. Pt lacks judgement to consent for mental health services. Pt makes comments about \"being dead soon\" and \"God take my soul\", but unable to assess for safety as pt is uncooperative.     Plan for Care reviewed with Assigned Medical Provider? Yes. Provider, Dr. Monson, response: Reported that psychiatrist is recommended pt discharge home. MD has not met with pt and does not know anything about pt. Will be discharging pt home.    Extended Care will follow and meet with patient/family/care team as able or requested.     Flavia Conde, [facility], Extended Care   [phone]          "

## 2022-10-19 NOTE — TELEPHONE ENCOUNTER
R:  No appropriate beds within [place].  Bed Search update within [facility] 11:00PM  [facility]- No beds available  [facility]-No beds available  [facility]-No beds available  [facility] beds available  [facility]-2 beds available per website.  Called- At capacity.  Stated we can try again tomorrow.  [facility]-No beds available  [facility]-No beds available  [facility]-No beds available     Pt remains on work list awaiting appropriate placement.
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
## 2022-10-19 NOTE — CONSULTS
"          Initial Psychiatric Consult   Consult date: October 19, 2022         Reason for Consult, requesting source:    Disposition; commitment? DEC assessment by RAQUEL Olson from 10/15 is reviewed (and subsequent behavioral health notes reviewed).   Requesting source: ED MD     Labs and imaging reviewed. Discussed with his nurse.         HPI:   From ED MD note 10/15:   \"Rhonda Bueno is a 32 year old [place]ian male who presents to the Emergency Department  department with ongoing hallucinations.  It is unclear what his past history is as the patient has no documentation in our Epic system and nothing can be found in Care Everywhere.  It is also unknown exactly what medications the patient is on currently.  The patient presents stating that he is being used as a machine by others.  Patient states he is only one person fighting against all these other individuals and he points to one of them standing in the room which is clearly a hallucination.  The patient states that his front lower teeth hurt but other than that he has no complaints of other pain, chest pain, shortness of breath, abdominal pain, vomiting or diarrhea.  The patient admits to paranoid hallucinations, delusions, and suicidal ideation.'    Due to his degree of disorganization Nhung Chan from the DEC thought that he should be admitted to IP psychiatry, and he is now on a 72 hour hold.   When I spoke to him he was clearly quite disorganized and paranoid.  He was alluding to some trouble at the group home he was staying at and thought he would just go to a shelter instead. As the interview progressed he became more logical and rational but remained quite paranoid and and he had some delusions present. He denied depression symptoms and told me he was not suicidal. He did become more personable as we talked. He was vague about trauma history or PTSD symptoms.     I had an opportunity to speak with his [employer] worker who mentions that Rhonda " "actually functions quite well. Rhonda also had me talk to to his ex wife who didn't see a compelling reason to admit him to the hospital, but she mentioned that his drug use was problem.          Past Psychiatric History:   According to his [facility] worker he has been hospitalized at [facility] a number of times, but doesn't take medicine. Is supposed to follow at [facility].         Substance Use and History:   He admits to snorting amphetamine after I mentioned the positive U[place]. Apparently he also drinks a lot. He mentioned that he might seek some CD treatment.         Past Medical History:   PAST MEDICAL HISTORY: He says that he is healthy.     PAST SURGICAL HISTORY: None           Family History:   FAMILY HISTORY: No family history on file.        Social History:   He says that he has been in [place] for about a year, was in [place] before. His housing situation is not clear. He says that he moved from [facility] with his wife and child in 2014.          Physical ROS:   The 10 point Review of Systems is negative other than noted in the HPI or here.           Medications:       OLANZapine  10 mg Intramuscular Once     OLANZapine zydis  10 mg Oral Once     QUEtiapine  50 mg Oral BID              Allergies:   No Known Allergies       Labs:   No results found for this or any previous visit (from the past 48 hour(s)).       Physical and Psychiatric Examination:     /59 (BP Location: Right arm)   Pulse 68   Temp (!) 96.5  F (35.8  C) (Oral)   Resp 16   SpO2 97%   Weight is 0 lbs 0 oz  There is no height or weight on file to calculate BMI.    Physical Exam:  I have reviewed the physical exam as documented by by the medical team and agree with findings and assessment and have no additional findings to add at this time.         MSE:   Appearance: awake, alert and adequately groomed  Attitude:  evasive  Eye Contact:  fair  Mood:  \"OK\"  Affect:  : slightly restricted  Speech:  clear, coherent  Psychomotor " "Behavior:  no evidence of tardive dyskinesia, dystonia, or tics  Thought Process:  tangental  Associations:  loosening of associations present  Thought Content:  he is paranoid, people trying to harm him   Insight:  limited  Judgement:  limited  Oriented to:  time, person, and place  Attention Span and Concentration:  fair  Recent and Remote Memory:  unable to assess              DSM-5 Diagnosis:   295.90  (F20.9) Schizophrenia   Amphetamine and alcohol use disorders           Assessment:   Unfortunately I think that this patient is mis-identified (he does have an mental health history which should be in Epic or Care Everywhere. He is marked for a merge with another patient, but also no history present on this person. Regardless, at this time he is not a danger to self or others, or unable to care for self: he is not holdable with the information we have now. Therefore I dropped the 72 hour hold and took him off the list for admission to inpatient psychiatry.   He mentions that he interested in a chemical dependency treatment.           Summary of Recommendations:   He was provided with the phone number for our Mental Health and Addictions Services to set up a CD assessment; [phone]    Page me as needed.  Edvin Padilla M.D.   [facility]   Contact information available via [facility] Paging/Directory  If I am not available, then [facility] CL line ([phone]) should know who   Is covering our consult service.         \"This dictation was performed with voice recognition software and may contain errors,  omissions and inadvertent word substitution.\"           "

## 2022-10-22 LAB
AMPHET SERPL-MCNC: 29 NG/ML
MDA SERPL-MCNC: <20 NG/ML
MDEA SERPL-MCNC: <20 NG/ML
MDMA SERPL-MCNC: <20 NG/ML
METHAMPHET SERPL-MCNC: 120 NG/ML